# Patient Record
Sex: MALE | Race: BLACK OR AFRICAN AMERICAN | NOT HISPANIC OR LATINO | Employment: OTHER | URBAN - METROPOLITAN AREA
[De-identification: names, ages, dates, MRNs, and addresses within clinical notes are randomized per-mention and may not be internally consistent; named-entity substitution may affect disease eponyms.]

---

## 2017-11-14 ENCOUNTER — HOSPITAL ENCOUNTER (INPATIENT)
Facility: HOSPITAL | Age: 67
LOS: 2 days | Discharge: HOME | DRG: 176 | End: 2017-11-16
Attending: INTERNAL MEDICINE | Admitting: INTERNAL MEDICINE
Payer: MEDICARE

## 2017-11-14 DIAGNOSIS — I26.92 ACUTE SADDLE PULMONARY EMBOLISM: ICD-10-CM

## 2017-11-14 DIAGNOSIS — I26.99 PULMONARY EMBOLISM: ICD-10-CM

## 2017-11-14 PROBLEM — E11.9 TYPE 2 DIABETES MELLITUS WITHOUT COMPLICATION: Chronic | Status: ACTIVE | Noted: 2017-11-14

## 2017-11-14 PROBLEM — R63.4 WEIGHT LOSS: Status: ACTIVE | Noted: 2017-11-14

## 2017-11-14 LAB
ALBUMIN SERPL BCP-MCNC: 2.9 G/DL
ALP SERPL-CCNC: 60 U/L
ALT SERPL W/O P-5'-P-CCNC: 15 U/L
ANION GAP SERPL CALC-SCNC: 14 MMOL/L
AST SERPL-CCNC: 22 U/L
BASOPHILS # BLD AUTO: 0.02 K/UL
BASOPHILS NFR BLD: 0.2 %
BILIRUB SERPL-MCNC: 0.9 MG/DL
BNP SERPL-MCNC: 51 PG/ML
BUN SERPL-MCNC: 10 MG/DL
CALCIUM SERPL-MCNC: 9.3 MG/DL
CHLORIDE SERPL-SCNC: 98 MMOL/L
CO2 SERPL-SCNC: 27 MMOL/L
CREAT SERPL-MCNC: 0.8 MG/DL
DIFFERENTIAL METHOD: ABNORMAL
EOSINOPHIL # BLD AUTO: 0.1 K/UL
EOSINOPHIL NFR BLD: 1.3 %
ERYTHROCYTE [DISTWIDTH] IN BLOOD BY AUTOMATED COUNT: 14.6 %
EST. GFR  (AFRICAN AMERICAN): >60 ML/MIN/1.73 M^2
EST. GFR  (NON AFRICAN AMERICAN): >60 ML/MIN/1.73 M^2
FACT X PPP CHRO-ACNC: >1.78 IU/ML
GLUCOSE SERPL-MCNC: 191 MG/DL
HCT VFR BLD AUTO: 33.7 %
HGB BLD-MCNC: 11.6 G/DL
IMM GRANULOCYTES # BLD AUTO: 0.05 K/UL
IMM GRANULOCYTES NFR BLD AUTO: 0.6 %
INR PPP: 1.2
LYMPHOCYTES # BLD AUTO: 3.5 K/UL
LYMPHOCYTES NFR BLD: 40.9 %
MAGNESIUM SERPL-MCNC: 1.6 MG/DL
MCH RBC QN AUTO: 29.7 PG
MCHC RBC AUTO-ENTMCNC: 34.4 G/DL
MCV RBC AUTO: 86 FL
MONOCYTES # BLD AUTO: 0.9 K/UL
MONOCYTES NFR BLD: 10.4 %
NEUTROPHILS # BLD AUTO: 3.9 K/UL
NEUTROPHILS NFR BLD: 46.6 %
NRBC BLD-RTO: 0 /100 WBC
PLATELET # BLD AUTO: 187 K/UL
PMV BLD AUTO: 11.4 FL
POTASSIUM SERPL-SCNC: 2.8 MMOL/L
PROT SERPL-MCNC: 6.3 G/DL
PROTHROMBIN TIME: 12.3 SEC
RBC # BLD AUTO: 3.9 M/UL
SODIUM SERPL-SCNC: 139 MMOL/L
TROPONIN I SERPL DL<=0.01 NG/ML-MCNC: 0.06 NG/ML
WBC # BLD AUTO: 8.44 K/UL

## 2017-11-14 PROCEDURE — 80053 COMPREHEN METABOLIC PANEL: CPT

## 2017-11-14 PROCEDURE — 93010 ELECTROCARDIOGRAM REPORT: CPT | Mod: ,,, | Performed by: INTERNAL MEDICINE

## 2017-11-14 PROCEDURE — 83880 ASSAY OF NATRIURETIC PEPTIDE: CPT

## 2017-11-14 PROCEDURE — 20000000 HC ICU ROOM

## 2017-11-14 PROCEDURE — 83735 ASSAY OF MAGNESIUM: CPT

## 2017-11-14 PROCEDURE — 94761 N-INVAS EAR/PLS OXIMETRY MLT: CPT

## 2017-11-14 PROCEDURE — 85610 PROTHROMBIN TIME: CPT

## 2017-11-14 PROCEDURE — 85520 HEPARIN ASSAY: CPT

## 2017-11-14 PROCEDURE — 85025 COMPLETE CBC W/AUTO DIFF WBC: CPT

## 2017-11-14 PROCEDURE — 99223 1ST HOSP IP/OBS HIGH 75: CPT | Mod: AI,GC,, | Performed by: INTERNAL MEDICINE

## 2017-11-14 PROCEDURE — 25000003 PHARM REV CODE 250: Performed by: INTERNAL MEDICINE

## 2017-11-14 PROCEDURE — 84484 ASSAY OF TROPONIN QUANT: CPT

## 2017-11-14 PROCEDURE — 63600175 PHARM REV CODE 636 W HCPCS: Performed by: INTERNAL MEDICINE

## 2017-11-14 RX ORDER — INSULIN ASPART 100 [IU]/ML
0-5 INJECTION, SOLUTION INTRAVENOUS; SUBCUTANEOUS EVERY 6 HOURS PRN
Status: DISCONTINUED | OUTPATIENT
Start: 2017-11-14 | End: 2017-11-16 | Stop reason: HOSPADM

## 2017-11-14 RX ORDER — PANTOPRAZOLE SODIUM 40 MG/1
40 TABLET, DELAYED RELEASE ORAL DAILY
Status: DISCONTINUED | OUTPATIENT
Start: 2017-11-15 | End: 2017-11-16 | Stop reason: HOSPADM

## 2017-11-14 RX ORDER — GLUCAGON 1 MG
1 KIT INJECTION
Status: DISCONTINUED | OUTPATIENT
Start: 2017-11-14 | End: 2017-11-16 | Stop reason: HOSPADM

## 2017-11-14 RX ORDER — POTASSIUM CHLORIDE 20 MEQ/1
60 TABLET, EXTENDED RELEASE ORAL
Status: COMPLETED | OUTPATIENT
Start: 2017-11-14 | End: 2017-11-15

## 2017-11-14 RX ORDER — HEPARIN SODIUM,PORCINE/D5W 25000/250
19 INTRAVENOUS SOLUTION INTRAVENOUS CONTINUOUS
Status: DISCONTINUED | OUTPATIENT
Start: 2017-11-14 | End: 2017-11-16 | Stop reason: HOSPADM

## 2017-11-14 RX ADMIN — HEPARIN SODIUM AND DEXTROSE 19 UNITS/KG/HR: 10000; 5 INJECTION INTRAVENOUS at 07:11

## 2017-11-14 RX ADMIN — POTASSIUM CHLORIDE 60 MEQ: 1500 TABLET, EXTENDED RELEASE ORAL at 10:11

## 2017-11-14 RX ADMIN — FOLIC ACID: 5 INJECTION, SOLUTION INTRAMUSCULAR; INTRAVENOUS; SUBCUTANEOUS at 10:11

## 2017-11-15 LAB
ALBUMIN SERPL BCP-MCNC: 2.6 G/DL
ALP SERPL-CCNC: 60 U/L
ALT SERPL W/O P-5'-P-CCNC: 12 U/L
ANION GAP SERPL CALC-SCNC: 10 MMOL/L
AST SERPL-CCNC: 24 U/L
BASOPHILS # BLD AUTO: 0.03 K/UL
BASOPHILS NFR BLD: 0.4 %
BILIRUB SERPL-MCNC: 0.9 MG/DL
BUN SERPL-MCNC: 9 MG/DL
CALCIUM SERPL-MCNC: 8.9 MG/DL
CHLORIDE SERPL-SCNC: 103 MMOL/L
CO2 SERPL-SCNC: 27 MMOL/L
CREAT SERPL-MCNC: 0.8 MG/DL
DIASTOLIC DYSFUNCTION: NO
DIFFERENTIAL METHOD: ABNORMAL
EOSINOPHIL # BLD AUTO: 0.2 K/UL
EOSINOPHIL NFR BLD: 2.3 %
ERYTHROCYTE [DISTWIDTH] IN BLOOD BY AUTOMATED COUNT: 14.4 %
EST. GFR  (AFRICAN AMERICAN): >60 ML/MIN/1.73 M^2
EST. GFR  (NON AFRICAN AMERICAN): >60 ML/MIN/1.73 M^2
ESTIMATED PA SYSTOLIC PRESSURE: 26.42
FACT X PPP CHRO-ACNC: 0.7 IU/ML
FACT X PPP CHRO-ACNC: 1.26 IU/ML
GLUCOSE SERPL-MCNC: 149 MG/DL
HCT VFR BLD AUTO: 31.2 %
HGB BLD-MCNC: 10.7 G/DL
IMM GRANULOCYTES # BLD AUTO: 0.03 K/UL
IMM GRANULOCYTES NFR BLD AUTO: 0.4 %
LYMPHOCYTES # BLD AUTO: 3.5 K/UL
LYMPHOCYTES NFR BLD: 44.3 %
MAGNESIUM SERPL-MCNC: 1.7 MG/DL
MAGNESIUM SERPL-MCNC: 1.9 MG/DL
MCH RBC QN AUTO: 29.7 PG
MCHC RBC AUTO-ENTMCNC: 34.3 G/DL
MCV RBC AUTO: 87 FL
MITRAL VALVE MOBILITY: NORMAL
MONOCYTES # BLD AUTO: 0.8 K/UL
MONOCYTES NFR BLD: 10.2 %
NEUTROPHILS # BLD AUTO: 3.3 K/UL
NEUTROPHILS NFR BLD: 42.4 %
NRBC BLD-RTO: 0 /100 WBC
PLATELET # BLD AUTO: 171 K/UL
PMV BLD AUTO: 11.4 FL
POCT GLUCOSE: 151 MG/DL (ref 70–110)
POCT GLUCOSE: 168 MG/DL (ref 70–110)
POCT GLUCOSE: 172 MG/DL (ref 70–110)
POCT GLUCOSE: 173 MG/DL (ref 70–110)
POTASSIUM SERPL-SCNC: 2.6 MMOL/L
POTASSIUM SERPL-SCNC: 3.2 MMOL/L
POTASSIUM SERPL-SCNC: 3.3 MMOL/L
PROT SERPL-MCNC: 5.4 G/DL
RBC # BLD AUTO: 3.6 M/UL
RETIRED EF AND QEF - SEE NOTES: 60 (ref 55–65)
SODIUM SERPL-SCNC: 140 MMOL/L
TROPONIN I SERPL DL<=0.01 NG/ML-MCNC: 0.06 NG/ML
TROPONIN I SERPL DL<=0.01 NG/ML-MCNC: 0.06 NG/ML
WBC # BLD AUTO: 7.78 K/UL

## 2017-11-15 PROCEDURE — 94761 N-INVAS EAR/PLS OXIMETRY MLT: CPT

## 2017-11-15 PROCEDURE — 93970 EXTREMITY STUDY: CPT

## 2017-11-15 PROCEDURE — 80053 COMPREHEN METABOLIC PANEL: CPT

## 2017-11-15 PROCEDURE — 85520 HEPARIN ASSAY: CPT

## 2017-11-15 PROCEDURE — 63600175 PHARM REV CODE 636 W HCPCS: Performed by: INTERNAL MEDICINE

## 2017-11-15 PROCEDURE — 99232 SBSQ HOSP IP/OBS MODERATE 35: CPT | Mod: GC,,, | Performed by: INTERNAL MEDICINE

## 2017-11-15 PROCEDURE — 25000003 PHARM REV CODE 250: Performed by: STUDENT IN AN ORGANIZED HEALTH CARE EDUCATION/TRAINING PROGRAM

## 2017-11-15 PROCEDURE — 25000003 PHARM REV CODE 250: Performed by: INTERNAL MEDICINE

## 2017-11-15 PROCEDURE — 93970 EXTREMITY STUDY: CPT | Mod: 26,,, | Performed by: INTERNAL MEDICINE

## 2017-11-15 PROCEDURE — 93306 TTE W/DOPPLER COMPLETE: CPT | Mod: 26,,, | Performed by: INTERNAL MEDICINE

## 2017-11-15 PROCEDURE — 85025 COMPLETE CBC W/AUTO DIFF WBC: CPT

## 2017-11-15 PROCEDURE — 83735 ASSAY OF MAGNESIUM: CPT

## 2017-11-15 PROCEDURE — 84132 ASSAY OF SERUM POTASSIUM: CPT | Mod: 91

## 2017-11-15 PROCEDURE — 84484 ASSAY OF TROPONIN QUANT: CPT | Mod: 91

## 2017-11-15 PROCEDURE — 83735 ASSAY OF MAGNESIUM: CPT | Mod: 91

## 2017-11-15 PROCEDURE — 20600001 HC STEP DOWN PRIVATE ROOM

## 2017-11-15 PROCEDURE — 84132 ASSAY OF SERUM POTASSIUM: CPT

## 2017-11-15 PROCEDURE — 84484 ASSAY OF TROPONIN QUANT: CPT

## 2017-11-15 PROCEDURE — 93306 TTE W/DOPPLER COMPLETE: CPT

## 2017-11-15 RX ORDER — POTASSIUM CHLORIDE 20 MEQ/1
60 TABLET, EXTENDED RELEASE ORAL ONCE
Status: COMPLETED | OUTPATIENT
Start: 2017-11-15 | End: 2017-11-15

## 2017-11-15 RX ORDER — MAGNESIUM SULFATE HEPTAHYDRATE 40 MG/ML
2 INJECTION, SOLUTION INTRAVENOUS ONCE
Status: COMPLETED | OUTPATIENT
Start: 2017-11-15 | End: 2017-11-15

## 2017-11-15 RX ORDER — POTASSIUM CHLORIDE 7.45 MG/ML
10 INJECTION INTRAVENOUS ONCE
Status: COMPLETED | OUTPATIENT
Start: 2017-11-15 | End: 2017-11-15

## 2017-11-15 RX ORDER — POTASSIUM CHLORIDE 20 MEQ/1
60 TABLET, EXTENDED RELEASE ORAL
Status: DISCONTINUED | OUTPATIENT
Start: 2017-11-15 | End: 2017-11-15

## 2017-11-15 RX ORDER — MAGNESIUM SULFATE HEPTAHYDRATE 40 MG/ML
2 INJECTION, SOLUTION INTRAVENOUS ONCE
Status: DISCONTINUED | OUTPATIENT
Start: 2017-11-15 | End: 2017-11-15

## 2017-11-15 RX ADMIN — HEPARIN SODIUM AND DEXTROSE 13 UNITS/KG/HR: 10000; 5 INJECTION INTRAVENOUS at 06:11

## 2017-11-15 RX ADMIN — PANTOPRAZOLE SODIUM 40 MG: 40 TABLET, DELAYED RELEASE ORAL at 08:11

## 2017-11-15 RX ADMIN — POTASSIUM CHLORIDE 60 MEQ: 1500 TABLET, EXTENDED RELEASE ORAL at 01:11

## 2017-11-15 RX ADMIN — POTASSIUM CHLORIDE 10 MEQ: 10 INJECTION, SOLUTION INTRAVENOUS at 04:11

## 2017-11-15 RX ADMIN — MAGNESIUM SULFATE IN WATER 2 G: 40 INJECTION, SOLUTION INTRAVENOUS at 01:11

## 2017-11-15 RX ADMIN — POTASSIUM CHLORIDE 60 MEQ: 1500 TABLET, EXTENDED RELEASE ORAL at 08:11

## 2017-11-15 RX ADMIN — POTASSIUM CHLORIDE 10 MEQ: 10 INJECTION, SOLUTION INTRAVENOUS at 03:11

## 2017-11-15 RX ADMIN — MAGNESIUM SULFATE IN WATER 2 G: 40 INJECTION, SOLUTION INTRAVENOUS at 06:11

## 2017-11-15 RX ADMIN — POTASSIUM CHLORIDE 60 MEQ: 1500 TABLET, EXTENDED RELEASE ORAL at 03:11

## 2017-11-15 NOTE — PROGRESS NOTES
Notified MD Mandujano of pts K 3.3 & mag 1.9. New order to replace potassium. Per MD Mandujano, magnesium ok at 1.9 without furthur replacement. Will continue to monitor.

## 2017-11-15 NOTE — SUBJECTIVE & OBJECTIVE
Interval History: NAEON. Patient denies CP, SOB or discomfort    Review of Systems   Constitution: Positive for weakness. Negative for malaise/fatigue, night sweats and weight loss.   HENT: Negative for nosebleeds.    Eyes: Negative for vision loss in left eye and vision loss in right eye.   Cardiovascular: Positive for dyspnea on exertion. Negative for chest pain, claudication, cyanosis, irregular heartbeat, leg swelling and near-syncope.   Respiratory: Positive for shortness of breath (on exertion ). Negative for cough and sleep disturbances due to breathing.    Endocrine: Negative for cold intolerance and heat intolerance.   Skin: Negative for rash and skin cancer.   Musculoskeletal: Negative for back pain and falls.   Gastrointestinal: Negative for melena, nausea and vomiting.   Genitourinary: Negative for frequency and pelvic pain.     Objective:     Vital Signs (Most Recent):  Temp: 97.5 °F (36.4 °C) (11/15/17 1100)  Pulse: 80 (11/15/17 1300)  Resp: 15 (11/15/17 1300)  BP: (!) 150/87 (11/15/17 1300)  SpO2: 96 % (11/15/17 1300) Vital Signs (24h Range):  Temp:  [97.5 °F (36.4 °C)-98.6 °F (37 °C)] 97.5 °F (36.4 °C)  Pulse:  [67-80] 80  Resp:  [11-29] 15  SpO2:  [96 %-100 %] 96 %  BP: (114-154)/(58-87) 150/87     Weight: 110 kg (242 lb 8.1 oz)  Body mass index is 31.99 kg/m².     SpO2: 96 %  O2 Device (Oxygen Therapy): room air      Intake/Output Summary (Last 24 hours) at 11/15/17 1336  Last data filed at 11/15/17 1300   Gross per 24 hour   Intake          1110.35 ml   Output              225 ml   Net           885.35 ml       Lines/Drains/Airways     Peripheral Intravenous Line                 Peripheral IV - Single Lumen 11/14/17 Left Hand 1 day         Peripheral IV - Single Lumen 11/14/17 Right Antecubital 1 day                Physical Exam   Constitutional: He is oriented to person, place, and time. He appears well-developed and well-nourished.   HENT:   Head: Normocephalic and atraumatic.   Eyes: EOM are  normal. Pupils are equal, round, and reactive to light.   Neck: Normal range of motion. Neck supple. No JVD present.   Cardiovascular: Normal rate, regular rhythm, normal heart sounds and intact distal pulses.  Exam reveals no gallop and no friction rub.    No murmur heard.  Pulmonary/Chest: Effort normal and breath sounds normal. No respiratory distress. He has no wheezes. He has no rales. He exhibits no tenderness.   Abdominal: Soft. Bowel sounds are normal. He exhibits no distension. There is no tenderness.   Excess skin on abdomen   Musculoskeletal: Normal range of motion. He exhibits no edema or tenderness.   Neurological: He is alert and oriented to person, place, and time.   Skin: Skin is warm and dry.   Vitals reviewed.      Significant Labs:   Blood Culture: No results for input(s): LABBLOO in the last 48 hours., CMP   Recent Labs  Lab 11/14/17  1945 11/15/17  0045 11/15/17  0415     --  140   K 2.8* 2.6* 3.2*   CL 98  --  103   CO2 27  --  27   *  --  149*   BUN 10  --  9   CREATININE 0.8  --  0.8   CALCIUM 9.3  --  8.9   PROT 6.3  --  5.4*   ALBUMIN 2.9*  --  2.6*   BILITOT 0.9  --  0.9   ALKPHOS 60  --  60   AST 22  --  24   ALT 15  --  12   ANIONGAP 14  --  10   ESTGFRAFRICA >60.0  --  >60.0   EGFRNONAA >60.0  --  >60.0   , CBC   Recent Labs  Lab 11/14/17  1945 11/15/17  0415   WBC 8.44 7.78   HGB 11.6* 10.7*   HCT 33.7* 31.2*    171    and Troponin   Recent Labs  Lab 11/14/17  1945 11/15/17  0045 11/15/17  0415   TROPONINI 0.062* 0.061* 0.056*       Significant Imaging: Echocardiogram:   2D echo with color flow doppler:   Results for orders placed or performed during the hospital encounter of 11/14/17   2D echo with color flow doppler   Result Value Ref Range    EF 60 55 - 65    Diastolic Dysfunction No     Est. PA Systolic Pressure 26.42     Mitral Valve Mobility NORMAL

## 2017-11-15 NOTE — ASSESSMENT & PLAN NOTE
Low risk submassive PE  Continue anticoagulation with heparin  Discussed case with interventional cardiology  2d echo in am  Monitor in ICU overnight  Check venous doppler LE in am

## 2017-11-15 NOTE — SUBJECTIVE & OBJECTIVE
Past Medical History:   Diagnosis Date    Diabetes mellitus        Past Surgical History:   Procedure Laterality Date    none         Review of patient's allergies indicates:  No Known Allergies    No current facility-administered medications on file prior to encounter.      No current outpatient prescriptions on file prior to encounter.     Family History     Problem Relation (Age of Onset)    Cancer Father        Social History Main Topics    Smoking status: Former Smoker     Types: Cigarettes    Smokeless tobacco: Not on file    Alcohol use No    Drug use: No    Sexual activity: Not on file     Review of Systems   All other systems reviewed and are negative.    Objective:     Vital Signs (Most Recent):  Pulse: 69 (11/14/17 2301)  Resp: 15 (11/14/17 2301)  BP: (!) 125/58 (11/14/17 2301)  SpO2: 100 % (11/14/17 2301) Vital Signs (24h Range):  Pulse:  [68-75] 69  Resp:  [11-29] 15  SpO2:  [100 %] 100 %  BP: (114-131)/(58-78) 125/58     Weight: 110 kg (242 lb 8.1 oz)  Body mass index is 31.99 kg/m².    SpO2: 100 %  O2 Device (Oxygen Therapy): nasal cannula      Intake/Output Summary (Last 24 hours) at 11/14/17 2323  Last data filed at 11/14/17 2300   Gross per 24 hour   Intake           163.05 ml   Output                0 ml   Net           163.05 ml       Lines/Drains/Airways     Peripheral Intravenous Line                 Peripheral IV - Single Lumen 11/14/17 Left Hand less than 1 day         Peripheral IV - Single Lumen 11/14/17 Right Antecubital less than 1 day                Physical Exam   General: alert, awake and oriented x 3  Eyes:PERRL.   Neck:no JVD   Lungs:  clear to auscultation bilaterally and normal respiratory effort  Cardiovascular: Heart: regular rate and rhythm, S1, S2 normal, no murmur, click, rub or gallop.   Chest Wall: no tenderness.   Extremities: no cyanosis or edema.   Pulses: 2+ and symmetric.  Abdomen/Rectal: Abdomen: soft, non-tender non-distented; bowel sounds normal  Skin: No  rashes or lesions  Neurologic: Normal strength and tone. No focal numbness or weakness  Psych/Behavioral:  Normal.      Significant Labs:   CMP   Recent Labs  Lab 11/14/17 1945      K 2.8*   CL 98   CO2 27   *   BUN 10   CREATININE 0.8   CALCIUM 9.3   PROT 6.3   ALBUMIN 2.9*   BILITOT 0.9   ALKPHOS 60   AST 22   ALT 15   ANIONGAP 14   ESTGFRAFRICA >60.0   EGFRNONAA >60.0   , CBC   Recent Labs  Lab 11/14/17 1945   WBC 8.44   HGB 11.6*   HCT 33.7*       and Troponin   Recent Labs  Lab 11/14/17 1945   TROPONINI 0.062*       Significant Imaging: Echocardiogram: 2D echo with color flow doppler: No results found for this or any previous visit.     EKG-sinus rhythm with non specific changes, No RV strain

## 2017-11-15 NOTE — HOSPITAL COURSE
11/15/17- Continue on heparin for PE and LLE DVT.    11/16/17-patient will be discharged on Xarelto with follow up for PE and weight loss with PCP

## 2017-11-15 NOTE — PLAN OF CARE
11/15/17 1356   Discharge Assessment   Assessment Type Discharge Planning Assessment   Confirmed/corrected address and phone number on facesheet? Yes   Assessment information obtained from? Medical Record   Expected Length of Stay (days) 3   Prior to hospitilization cognitive status: Alert/Oriented   Prior to hospitalization functional status: Independent   Current cognitive status: Alert/Oriented   Current Functional Status: Needs Assistance   Facility Arrived From: home via ED   Lives With alone   Able to Return to Prior Arrangements yes   Is patient able to care for self after discharge? Yes   Who are your caregiver(s) and their phone number(s)? Ladarius (Sister) [phone]   Patient's perception of discharge disposition home or selfcare   Readmission Within The Last 30 Days no previous admission in last 30 days   Patient currently being followed by outpatient case management? No   Patient currently receives any other outside agency services? No   Equipment Currently Used at Home none   Do you have any problems affording any of your prescribed medications? No   Is the patient taking medications as prescribed? yes   Does the patient have transportation home? Yes   Transportation Available car;family or friend will provide   Does the patient receive services at the [facility]? No   Discharge Plan A Home with family   Discharge Plan B Home with family;Home Health   Patient/Family In Agreement With Plan yes     Patient lives in [facility], mows lawns for a living. He is independent with ADL's however, has recently lost 200 lbs within last 6 months. Cards consulted Hem/Onc for work up of cause of DVT/PE. No other needs assessed at this time.

## 2017-11-15 NOTE — PLAN OF CARE
Problem: Patient Care Overview  Goal: Plan of Care Review  Outcome: Ongoing (interventions implemented as appropriate)  No acute events throughout the night. Pt remains on Heparin gtt per MD orders. Pt given Potassium and magnesium replacements. VS and assessment per flow sheet. Patient progressing towards goals as tolerated. Plan of care reviewed with patient. All questions and concerns addressed, will continue to monitor.

## 2017-11-15 NOTE — SUBJECTIVE & OBJECTIVE
No past medical history on file.    No past surgical history on file.    Review of patient's allergies indicates:  No Known Allergies    No current facility-administered medications on file prior to encounter.      No current outpatient prescriptions on file prior to encounter.     Family History     None        Social History Main Topics    Smoking status: Not on file    Smokeless tobacco: Not on file    Alcohol use Not on file    Drug use: Unknown    Sexual activity: Not on file     Review of Systems   Constitution: Positive for weakness and malaise/fatigue. Negative for night sweats and weight loss.   HENT: Negative for nosebleeds.    Eyes: Negative for vision loss in left eye and vision loss in right eye.   Cardiovascular: Positive for dyspnea on exertion. Negative for chest pain, claudication, cyanosis, irregular heartbeat, leg swelling and near-syncope.   Respiratory: Positive for shortness of breath. Negative for cough and sleep disturbances due to breathing.    Endocrine: Negative for cold intolerance and heat intolerance.     Objective:     Vital Signs (Most Recent):    Vital Signs (24h Range):        Weight: 108 kg (238 lb 1.6 oz)  Body mass index is 31.41 kg/m².            No intake or output data in the 24 hours ending 11/14/17 2059    Lines/Drains/Airways     Peripheral Intravenous Line                 Peripheral IV - Single Lumen 11/14/17 Left Hand less than 1 day         Peripheral IV - Single Lumen 11/14/17 Right Antecubital less than 1 day                Physical Exam   Constitutional: He is oriented to person, place, and time. He appears well-developed and well-nourished.   HENT:   Head: Normocephalic and atraumatic.   Eyes: EOM are normal. Pupils are equal, round, and reactive to light.   Neck: Normal range of motion. Neck supple. No JVD present.   Cardiovascular: Normal rate, regular rhythm, normal heart sounds and intact distal pulses.  Exam reveals no gallop and no friction rub.    No  murmur heard.  Pulmonary/Chest: Effort normal and breath sounds normal. No respiratory distress. He has no wheezes. He has no rales. He exhibits no tenderness.   Abdominal: Soft. Bowel sounds are normal. He exhibits no distension. There is no tenderness.   Musculoskeletal: Normal range of motion. He exhibits no edema or tenderness.   Neurological: He is alert and oriented to person, place, and time.   Skin: Skin is warm and dry.   Vitals reviewed.      Significant Labs:   BMP:   Recent Labs  Lab 11/14/17 1945   *      K 2.8*   CL 98   CO2 27   BUN 10   CREATININE 0.8   CALCIUM 9.3   MG 1.6       Significant Imaging: Echocardiogram: 2D echo with color flow doppler: no rv strain noted.

## 2017-11-15 NOTE — ASSESSMENT & PLAN NOTE
-Unintentional 200 Lb loss per patient in last 4-6 months  -would consider heme-onc consult and rule out malignancy as precipating factor for VTE

## 2017-11-15 NOTE — ASSESSMENT & PLAN NOTE
Patient with low risk submassive PE, currently no indication for thrombolysis.  HD stable, no RV strain, mildly elevated troponin.  Recommend anticoagulation with heparin for now.  Needs full eval for oncologic etiology of PE as has recent 200 lb weight loss.  Recommend Heme/onc consult.

## 2017-11-15 NOTE — HPI
67 year old male with h/o DM-2, HTN who presents as a transfer from [facility] with diagnosis of PE. Patient is currently resting in bed and denying any complaints.  HR is 70, BP is 117/78 satting 100% on 2 l NC.  He reports that he has been having dizziness and SOB on exertion x 4 months. Patient denies any chest pain, syncope, cough, hemoptysis.  Of note he has an almost 200 lb weight loss over past 6 months. 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    Echo: no RV strain, tapse 1.9

## 2017-11-15 NOTE — CONSULTS
[facility]  Cardiology  Consult Note    Patient Name: Juan Johnson  MRN: [id number]  Admission Date: 11/14/2017  Hospital Length of Stay: 0 days  Code Status: Full Code   Attending Provider: Gera Delacruz MD   Consulting Provider: Deanne Lamb MD  Primary Care Physician: Primary Doctor No  Principal Problem:<principal problem not specified>    Patient information was obtained from patient and ER records.     Inpatient consult to Interventional Cardiology  Consult performed by: DEANNE LAMB  Consult ordered by: AFSHIN BLANTON  Reason for consult: PE        Subjective:     Chief Complaint:  PE    HPI:   67 year old male with h/o DM-2, HTN who presents as a transfer from [facility] with diagnosis of PE. Patient is currently resting in bed and denying any complaints.  HR is 70, BP is 117/78 satting 100% on 2 l NC.  He reports that he has been having dizziness and SOB on exertion x 4 months. Patient denies any chest pain, syncope, cough, hemoptysis.  Of note he has an almost 200 lb weight loss over past 6 months.     Echo: no RV strain, tapse 1.9    No past medical history on file.    No past surgical history on file.    Review of patient's allergies indicates:  No Known Allergies    No current facility-administered medications on file prior to encounter.      No current outpatient prescriptions on file prior to encounter.     Family History     None        Social History Main Topics    Smoking status: Not on file    Smokeless tobacco: Not on file    Alcohol use Not on file    Drug use: Unknown    Sexual activity: Not on file     Review of Systems   Constitution: Positive for weakness and malaise/fatigue. Negative for night sweats and weight loss.   HENT: Negative for nosebleeds.    Eyes: Negative for vision loss in left eye and vision loss in right eye.   Cardiovascular: Positive for dyspnea on exertion. Negative for chest pain, claudication, cyanosis, irregular  heartbeat, leg swelling and near-syncope.   Respiratory: Positive for shortness of breath. Negative for cough and sleep disturbances due to breathing.    Endocrine: Negative for cold intolerance and heat intolerance.     Objective:     Vital Signs (Most Recent):    Vital Signs (24h Range):        Weight: 108 kg (238 lb 1.6 oz)  Body mass index is 31.41 kg/m².            No intake or output data in the 24 hours ending 11/14/17 2059    Lines/Drains/Airways     Peripheral Intravenous Line                 Peripheral IV - Single Lumen 11/14/17 Left Hand less than 1 day         Peripheral IV - Single Lumen 11/14/17 Right Antecubital less than 1 day                Physical Exam   Constitutional: He is oriented to person, place, and time. He appears well-developed and well-nourished.   HENT:   Head: Normocephalic and atraumatic.   Eyes: EOM are normal. Pupils are equal, round, and reactive to light.   Neck: Normal range of motion. Neck supple. No JVD present.   Cardiovascular: Normal rate, regular rhythm, normal heart sounds and intact distal pulses.  Exam reveals no gallop and no friction rub.    No murmur heard.  Pulmonary/Chest: Effort normal and breath sounds normal. No respiratory distress. He has no wheezes. He has no rales. He exhibits no tenderness.   Abdominal: Soft. Bowel sounds are normal. He exhibits no distension. There is no tenderness.   Musculoskeletal: Normal range of motion. He exhibits no edema or tenderness.   Neurological: He is alert and oriented to person, place, and time.   Skin: Skin is warm and dry.   Vitals reviewed.      Significant Labs:   BMP:   Recent Labs  Lab 11/14/17 1945   *      K 2.8*   CL 98   CO2 27   BUN 10   CREATININE 0.8   CALCIUM 9.3   MG 1.6       Significant Imaging: Echocardiogram: 2D echo with color flow doppler: no rv strain noted.    Assessment and Plan:     Acute saddle pulmonary embolism    Patient with low risk submassive PE, currently no indication for  thrombolysis.  HD stable, no RV strain, mildly elevated troponin.  Recommend anticoagulation with heparin for now.  Needs full eval for oncologic etiology of PE as has recent 200 lb weight loss.  Recommend Heme/onc consult.            VTE Risk Mitigation         Ordered     heparin 25,000 units in dextrose 5% 250 mL (100 units/mL) infusion  Continuous     Route:  Intravenous        11/14/17 1927     heparin 25,000 units in dextrose 5% 250 mL (100 units/mL) bolus from bag; ADDITIONAL PRN BOLUS  As needed (PRN)     Route:  Intravenous        11/14/17 1927     heparin 25,000 units in dextrose 5% 250 mL (100 units/mL) bolus from bag; ADDITIONAL PRN BOLUS  As needed (PRN)     Route:  Intravenous        11/14/17 1927          Thank you for your consult. I will follow-up with patient. Please contact us if you have any additional questions.    Deanne Rodriguez MD  Cardiology   [facility]

## 2017-11-15 NOTE — PROGRESS NOTES
[facility]  Cardiology  Progress Note    Patient Name: Juan Johnson  MRN: [id number]  Admission Date: 11/14/2017  Hospital Length of Stay: 1 days  Code Status: Full Code   Attending Physician: Gera Delacruz MD   Primary Care Physician: Primary Doctor No  Expected Discharge Date: 11/17/2017  Principal Problem:Acute saddle pulmonary embolism    Subjective:     Hospital Course:   11/15/17- Continue on heparin for PE and LLE DVT.      Interval History: NAEON. Patient denies CP, SOB or discomfort    Review of Systems   Constitution: Positive for weakness. Negative for malaise/fatigue, night sweats and weight loss.   HENT: Negative for nosebleeds.    Eyes: Negative for vision loss in left eye and vision loss in right eye.   Cardiovascular: Positive for dyspnea on exertion. Negative for chest pain, claudication, cyanosis, irregular heartbeat, leg swelling and near-syncope.   Respiratory: Positive for shortness of breath (on exertion ). Negative for cough and sleep disturbances due to breathing.    Endocrine: Negative for cold intolerance and heat intolerance.   Skin: Negative for rash and skin cancer.   Musculoskeletal: Negative for back pain and falls.   Gastrointestinal: Negative for melena, nausea and vomiting.   Genitourinary: Negative for frequency and pelvic pain.     Objective:     Vital Signs (Most Recent):  Temp: 97.5 °F (36.4 °C) (11/15/17 1100)  Pulse: 80 (11/15/17 1300)  Resp: 15 (11/15/17 1300)  BP: (!) 150/87 (11/15/17 1300)  SpO2: 96 % (11/15/17 1300) Vital Signs (24h Range):  Temp:  [97.5 °F (36.4 °C)-98.6 °F (37 °C)] 97.5 °F (36.4 °C)  Pulse:  [67-80] 80  Resp:  [11-29] 15  SpO2:  [96 %-100 %] 96 %  BP: (114-154)/(58-87) 150/87     Weight: 110 kg (242 lb 8.1 oz)  Body mass index is 31.99 kg/m².     SpO2: 96 %  O2 Device (Oxygen Therapy): room air      Intake/Output Summary (Last 24 hours) at 11/15/17 1336  Last data filed at 11/15/17 1300   Gross per 24 hour   Intake           1110.35 ml   Output              225 ml   Net           885.35 ml       Lines/Drains/Airways     Peripheral Intravenous Line                 Peripheral IV - Single Lumen 11/14/17 Left Hand 1 day         Peripheral IV - Single Lumen 11/14/17 Right Antecubital 1 day                Physical Exam   Constitutional: He is oriented to person, place, and time. He appears well-developed and well-nourished.   HENT:   Head: Normocephalic and atraumatic.   Eyes: EOM are normal. Pupils are equal, round, and reactive to light.   Neck: Normal range of motion. Neck supple. No JVD present.   Cardiovascular: Normal rate, regular rhythm, normal heart sounds and intact distal pulses.  Exam reveals no gallop and no friction rub.    No murmur heard.  Pulmonary/Chest: Effort normal and breath sounds normal. No respiratory distress. He has no wheezes. He has no rales. He exhibits no tenderness.   Abdominal: Soft. Bowel sounds are normal. He exhibits no distension. There is no tenderness.   Excess skin on abdomen   Musculoskeletal: Normal range of motion. He exhibits no edema or tenderness.   Neurological: He is alert and oriented to person, place, and time.   Skin: Skin is warm and dry.   Vitals reviewed.      Significant Labs:   Blood Culture: No results for input(s): LABBLOO in the last 48 hours., CMP   Recent Labs  Lab 11/14/17  1945 11/15/17  0045 11/15/17  0415     --  140   K 2.8* 2.6* 3.2*   CL 98  --  103   CO2 27  --  27   *  --  149*   BUN 10  --  9   CREATININE 0.8  --  0.8   CALCIUM 9.3  --  8.9   PROT 6.3  --  5.4*   ALBUMIN 2.9*  --  2.6*   BILITOT 0.9  --  0.9   ALKPHOS 60  --  60   AST 22  --  24   ALT 15  --  12   ANIONGAP 14  --  10   ESTGFRAFRICA >60.0  --  >60.0   EGFRNONAA >60.0  --  >60.0   , CBC   Recent Labs  Lab 11/14/17  1945 11/15/17  0415   WBC 8.44 7.78   HGB 11.6* 10.7*   HCT 33.7* 31.2*    171    and Troponin   Recent Labs  Lab 11/14/17  1945 11/15/17  0045 11/15/17  0415   TROPONINI  0.062* 0.061* 0.056*       Significant Imaging: Echocardiogram:   2D echo with color flow doppler:   Results for orders placed or performed during the hospital encounter of 11/14/17   2D echo with color flow doppler   Result Value Ref Range    EF 60 55 - 65    Diastolic Dysfunction No     Est. PA Systolic Pressure 26.42     Mitral Valve Mobility NORMAL      Assessment and Plan:       * Acute saddle pulmonary embolism    -Low risk submassive PE and venous doppler LE Indeterminate DVT of the Popliteal Vein.  -Continue anticoagulation with heparin  -Discussed case with interventional cardiology - no need for intervention at this time  - 2d echo showed normal left ventricular systolic function          Weight loss    -Unintentional 200 Lb loss per patient in last 4-6 months  -on discharge can follow up with PCP        Type 2 diabetes mellitus without complication    -Was on oral meds but were discontinued and now diet controlled  -Insulin sliding scale            VTE Risk Mitigation         Ordered     High Risk of VTE  Once      11/15/17 0844     Reason for no Mechanical VTE Prophylaxis  Once      11/15/17 0844     heparin 25,000 units in dextrose 5% 250 mL (100 units/mL) infusion  Continuous     Route:  Intravenous        11/14/17 1927     heparin 25,000 units in dextrose 5% 250 mL (100 units/mL) bolus from bag; ADDITIONAL PRN BOLUS  As needed (PRN)     Route:  Intravenous        11/14/17 1927     heparin 25,000 units in dextrose 5% 250 mL (100 units/mL) bolus from bag; ADDITIONAL PRN BOLUS  As needed (PRN)     Route:  Intravenous        11/14/17 1927          Kristine Mandujano MD  Cardiology  [facility]

## 2017-11-15 NOTE — ASSESSMENT & PLAN NOTE
-Low risk submassive PE and venous doppler LE Indeterminate DVT of the Popliteal Vein.  -Continue anticoagulation with heparin  -Discussed case with interventional cardiology - no need for intervention at this time  - 2d echo showed normal left ventricular systolic function

## 2017-11-15 NOTE — H&P
[facility]  Cardiology  History and Physical     Patient Name: Juan Johnson  MRN: [id number]  Admission Date: 11/14/2017  Code Status: Full Code   Attending Provider: Gera Delacruz MD   Primary Care Physician: Primary Doctor No  Principal Problem:Acute saddle pulmonary embolism    Patient information was obtained from patient and transfer records.     Subjective:     Chief Complaint:  SOB     HPI:  67 year old male with h/o DM-2, HTN who presents as a transfer from [facility] with diagnosis of PE. Patient is currently resting in bed and denying any complaints.  HR is 70, BP is 117/78 satting 100% on 2 l NC.  He reports that he has been having dizziness and SOB on exertion x 4 months. Patient denies any chest pain, syncope, cough, hemoptysis.  Of note he has an almost 200 lb weight loss over past 6 months. 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    Echo: no RV strain, tapse 1.9    Past Medical History:   Diagnosis Date    Diabetes mellitus        Past Surgical History:   Procedure Laterality Date    none         Review of patient's allergies indicates:  No Known Allergies    No current facility-administered medications on file prior to encounter.      No current outpatient prescriptions on file prior to encounter.     Family History     Problem Relation (Age of Onset)    Cancer Father        Social History Main Topics    Smoking status: Former Smoker     Types: Cigarettes    Smokeless tobacco: Not on file    Alcohol use No    Drug use: No    Sexual activity: Not on file     Review of Systems   All other systems reviewed and are negative.    Objective:     Vital Signs (Most Recent):  Pulse: 69 (11/14/17 2301)  Resp: 15 (11/14/17 2301)  BP: (!) 125/58 (11/14/17 2301)  SpO2: 100 % (11/14/17 2301) Vital Signs (24h Range):  Pulse:  [68-75] 69  Resp:  [11-29] 15  SpO2:  [100 %] 100  %  BP: (114-131)/(58-78) 125/58     Weight: 110 kg (242 lb 8.1 oz)  Body mass index is 31.99 kg/m².    SpO2: 100 %  O2 Device (Oxygen Therapy): nasal cannula      Intake/Output Summary (Last 24 hours) at 11/14/17 2323  Last data filed at 11/14/17 2300   Gross per 24 hour   Intake           163.05 ml   Output                0 ml   Net           163.05 ml       Lines/Drains/Airways     Peripheral Intravenous Line                 Peripheral IV - Single Lumen 11/14/17 Left Hand less than 1 day         Peripheral IV - Single Lumen 11/14/17 Right Antecubital less than 1 day                Physical Exam   General: alert, awake and oriented x 3  Eyes:PERRL.   Neck:no JVD   Lungs:  clear to auscultation bilaterally and normal respiratory effort  Cardiovascular: Heart: regular rate and rhythm, S1, S2 normal, no murmur, click, rub or gallop.   Chest Wall: no tenderness.   Extremities: no cyanosis or edema.   Pulses: 2+ and symmetric.  Abdomen/Rectal: Abdomen: soft, non-tender non-distented; bowel sounds normal  Skin: No rashes or lesions  Neurologic: Normal strength and tone. No focal numbness or weakness  Psych/Behavioral:  Normal.      Significant Labs:   CMP   Recent Labs  Lab 11/14/17 1945      K 2.8*   CL 98   CO2 27   *   BUN 10   CREATININE 0.8   CALCIUM 9.3   PROT 6.3   ALBUMIN 2.9*   BILITOT 0.9   ALKPHOS 60   AST 22   ALT 15   ANIONGAP 14   ESTGFRAFRICA >60.0   EGFRNONAA >60.0   , CBC   Recent Labs  Lab 11/14/17 1945   WBC 8.44   HGB 11.6*   HCT 33.7*       and Troponin   Recent Labs  Lab 11/14/17 1945   TROPONINI 0.062*       Significant Imaging: Echocardiogram: 2D echo with color flow doppler: No results found for this or any previous visit.     EKG-sinus rhythm with non specific changes, No RV strain    Assessment and Plan:     * Acute saddle pulmonary embolism    Low risk submassive PE  Continue anticoagulation with heparin  Discussed case with interventional cardiology  2d echo in  am  Monitor in ICU overnight  Check venous doppler LE in am        Weight loss    -Unintentional 200 Lb loss per patient in last 4-6 months  -would consider heme-onc consult and rule out malignancy as precipating factor for VTE        Type 2 diabetes mellitus without complication    Was on oral meds but were discontinued and now diet controlled  Insulin sliding scale            VTE Risk Mitigation         Ordered     heparin 25,000 units in dextrose 5% 250 mL (100 units/mL) infusion  Continuous     Route:  Intravenous        11/14/17 1927     heparin 25,000 units in dextrose 5% 250 mL (100 units/mL) bolus from bag; ADDITIONAL PRN BOLUS  As needed (PRN)     Route:  Intravenous        11/14/17 1927     heparin 25,000 units in dextrose 5% 250 mL (100 units/mL) bolus from bag; ADDITIONAL PRN BOLUS  As needed (PRN)     Route:  Intravenous        11/14/17 1927          Dom Valdes MD  Cardiology   [facility]

## 2017-11-15 NOTE — NURSING
Pt arrived from OSH, accompanied by EMS with heparin gtt running. Pt connected to the monitor, VS stable at this time, pt is aaox4, denies any chest pain. Cardiology service Dr Valdes at bedside, came and assessed patient, MD ordered for labs to be drawn and heparin gtt to be continued. Plan for bedside echo to be done soon.

## 2017-11-15 NOTE — PLAN OF CARE
Problem: Patient Care Overview  Goal: Plan of Care Review  Outcome: Ongoing (interventions implemented as appropriate)  No acute events throughout day. See vital signs and assessments in flowsheets. See below for updates on today's progress.     Pulmonary: room air with SpO2 maintaining 100%; SOB reported with exertion    Cardiovascular: SR 60s-70s with SBP 110s-140s    Neurological: AAOx4; afebrile    Gastrointestinal: cardiac diet ordered; liquid BM noted    Genitourinary: voids spontaneously per urinal - amy in color    Endocrine: SSI ordered for hyperglycemia    Integumentary/Other: + DVT in L popliteal    Infusions: Heparin gtt infusing    Patient progressing towards goals as tolerated, plan of care communicated and reviewed with Juan Johnson and family. All concerns addressed. Will continue to monitor.

## 2017-11-16 VITALS
HEIGHT: 73 IN | DIASTOLIC BLOOD PRESSURE: 74 MMHG | WEIGHT: 235.69 LBS | HEART RATE: 82 BPM | SYSTOLIC BLOOD PRESSURE: 137 MMHG | BODY MASS INDEX: 31.24 KG/M2 | TEMPERATURE: 98 F | RESPIRATION RATE: 18 BRPM | OXYGEN SATURATION: 98 %

## 2017-11-16 LAB
ALBUMIN SERPL BCP-MCNC: 2.6 G/DL
ALP SERPL-CCNC: 60 U/L
ALT SERPL W/O P-5'-P-CCNC: 14 U/L
ANION GAP SERPL CALC-SCNC: 11 MMOL/L
AST SERPL-CCNC: 20 U/L
BILIRUB SERPL-MCNC: 0.7 MG/DL
BUN SERPL-MCNC: 7 MG/DL
CALCIUM SERPL-MCNC: 9.2 MG/DL
CHLORIDE SERPL-SCNC: 109 MMOL/L
CO2 SERPL-SCNC: 23 MMOL/L
CREAT SERPL-MCNC: 0.7 MG/DL
EST. GFR  (AFRICAN AMERICAN): >60 ML/MIN/1.73 M^2
EST. GFR  (NON AFRICAN AMERICAN): >60 ML/MIN/1.73 M^2
FACT X PPP CHRO-ACNC: 0.71 IU/ML
FACT X PPP CHRO-ACNC: 0.76 IU/ML
GLUCOSE SERPL-MCNC: 149 MG/DL
MAGNESIUM SERPL-MCNC: 1.6 MG/DL
POCT GLUCOSE: 163 MG/DL (ref 70–110)
POTASSIUM SERPL-SCNC: 3.7 MMOL/L
POTASSIUM SERPL-SCNC: 3.7 MMOL/L
PROT SERPL-MCNC: 5.7 G/DL
SODIUM SERPL-SCNC: 143 MMOL/L

## 2017-11-16 PROCEDURE — 85520 HEPARIN ASSAY: CPT | Mod: 91

## 2017-11-16 PROCEDURE — 84132 ASSAY OF SERUM POTASSIUM: CPT

## 2017-11-16 PROCEDURE — 80053 COMPREHEN METABOLIC PANEL: CPT

## 2017-11-16 PROCEDURE — 85520 HEPARIN ASSAY: CPT

## 2017-11-16 PROCEDURE — 25000003 PHARM REV CODE 250: Performed by: INTERNAL MEDICINE

## 2017-11-16 PROCEDURE — 36415 COLL VENOUS BLD VENIPUNCTURE: CPT

## 2017-11-16 PROCEDURE — 63600175 PHARM REV CODE 636 W HCPCS: Performed by: STUDENT IN AN ORGANIZED HEALTH CARE EDUCATION/TRAINING PROGRAM

## 2017-11-16 PROCEDURE — 25000003 PHARM REV CODE 250: Performed by: STUDENT IN AN ORGANIZED HEALTH CARE EDUCATION/TRAINING PROGRAM

## 2017-11-16 PROCEDURE — 63600175 PHARM REV CODE 636 W HCPCS: Performed by: INTERNAL MEDICINE

## 2017-11-16 PROCEDURE — 83735 ASSAY OF MAGNESIUM: CPT

## 2017-11-16 PROCEDURE — 99239 HOSP IP/OBS DSCHRG MGMT >30: CPT | Mod: GC,,, | Performed by: INTERNAL MEDICINE

## 2017-11-16 RX ORDER — POTASSIUM CHLORIDE 20 MEQ/1
60 TABLET, EXTENDED RELEASE ORAL ONCE
Status: COMPLETED | OUTPATIENT
Start: 2017-11-16 | End: 2017-11-16

## 2017-11-16 RX ORDER — MAGNESIUM SULFATE HEPTAHYDRATE 40 MG/ML
2 INJECTION, SOLUTION INTRAVENOUS ONCE
Status: COMPLETED | OUTPATIENT
Start: 2017-11-16 | End: 2017-11-16

## 2017-11-16 RX ADMIN — HEPARIN SODIUM AND DEXTROSE 13 UNITS/KG/HR: 10000; 5 INJECTION INTRAVENOUS at 02:11

## 2017-11-16 RX ADMIN — POTASSIUM CHLORIDE 60 MEQ: 1500 TABLET, EXTENDED RELEASE ORAL at 08:11

## 2017-11-16 RX ADMIN — HEPARIN SODIUM AND DEXTROSE 12 UNITS/KG/HR: 10000; 5 INJECTION INTRAVENOUS at 05:11

## 2017-11-16 RX ADMIN — PANTOPRAZOLE SODIUM 40 MG: 40 TABLET, DELAYED RELEASE ORAL at 08:11

## 2017-11-16 RX ADMIN — MAGNESIUM SULFATE IN WATER 2 G: 40 INJECTION, SOLUTION INTRAVENOUS at 08:11

## 2017-11-16 NOTE — NURSING TRANSFER
Nursing Transfer Note      11/15/2017     Transfer To: 323    Transfer via wheelchair    Transfer with cardiac monitoring    Transported by RN & PCT    Medicines sent: Heparin gtt    Chart send with patient: Yes    Patient reassessed at: 11/15/17 1856    Upon arrival to floor: cardiac monitor applied, patient oriented to room, call bell in reach and bed in lowest position

## 2017-11-16 NOTE — DISCHARGE SUMMARY
[facility]  Cardiology  Discharge Summary      Patient Name: Juan Johnson  MRN: [id number]  Admission Date: 11/14/2017  Hospital Length of Stay: 2 days  Discharge Date and Time:  11/16/2017 1:23 PM  Attending Physician: Gera Delacruz MD    Discharging Provider: Kristine Mandujano MD  Primary Care Physician: Primary Doctor No    HPI:   67 year old male with h/o DM-2, HTN who presents as a transfer from [facility] with diagnosis of PE. Patient is currently resting in bed and denying any complaints.  HR is 70, BP is 117/78 satting 100% on 2 l NC.  He reports that he has been having dizziness and SOB on exertion x 4 months. Patient denies any chest pain, syncope, cough, hemoptysis.  Of note he has an almost 200 lb weight loss over past 6 months. 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    Echo: no RV strain, tapse 1.9    * No surgery found *     Indwelling Lines/Drains at time of discharge:  Lines/Drains/Airways          No matching active lines, drains, or airways          Hospital Course:  11/15/17- Continue on heparin for PE and LLE DVT.    11/16/17-patient will be discharged on Xarelto with follow up for PE and weight loss with PCP    Consults:   Consults         Status Ordering Provider     Inpatient consult to Interventional Cardiology  Once     Provider:  (Not yet assigned)    Completed AFSHIN BLANTON          Significant Diagnostic Studies: Labs:   CMP   Recent Labs  Lab 11/14/17  1945  11/15/17  0415 11/15/17  1418 11/16/17  0423     --  140  --  143   K 2.8*  < > 3.2* 3.3* 3.7  3.7   CL 98  --  103  --  109   CO2 27  --  27  --  23   *  --  149*  --  149*   BUN 10  --  9  --  7*   CREATININE 0.8  --  0.8  --  0.7   CALCIUM 9.3  --  8.9  --  9.2   PROT 6.3  --  5.4*  --  5.7*   ALBUMIN 2.9*  --  2.6*  --  2.6*   BILITOT 0.9  --  0.9  --  0.7   ALKPHOS 60  --  60   --  60   AST 22  --  24  --  20   ALT 15  --  12  --  14   ANIONGAP 14  --  10  --  11   ESTGFRAFRICA >60.0  --  >60.0  --  >60.0   EGFRNONAA >60.0  --  >60.0  --  >60.0   < > = values in this interval not displayed., CBC   Recent Labs  Lab 11/14/17  1945 11/15/17  0415   WBC 8.44 7.78   HGB 11.6* 10.7*   HCT 33.7* 31.2*    171    and Troponin   Recent Labs  Lab 11/15/17  0415   TROPONINI 0.056*       Pending Diagnostic Studies:     None          Final Active Diagnoses:    Diagnosis Date Noted POA    PRINCIPAL PROBLEM:  Acute saddle pulmonary embolism [I26.92] 11/14/2017 Yes    Type 2 diabetes mellitus without complication [E11.9] 11/14/2017 Yes     Chronic    Weight loss [R63.4] 11/14/2017 Yes      Problems Resolved During this Admission:    Diagnosis Date Noted Date Resolved POA     * Acute saddle pulmonary embolism    -Low risk submassive PE and venous doppler LE Indeterminate DVT of the Popliteal Vein.  -Continued on anticoagulation with heparin while inpatient  -At the time of admit, we discussed case with interventional cardiology - no need for intervention   - 2d echo showed normal left ventricular systolic function  -discharged on Xarelto 15 mg twice daily with food for 21 days followed by 20 mg once daily with food (this is an unproved DVT, will need >3 months)  -F/U with PCP for PE            Weight loss    -Unintentional 200 Lb loss per patient in last 4-6 months  -on discharge can follow up with PCP        Type 2 diabetes mellitus without complication    -Was on oral meds but were discontinued on admit and now diet controlled  -Insulin sliding scale was not needed while in hospital  -continue to follow diabetic diet on discharge            Discharged Condition: fair    Disposition: Home or Self Care    Follow Up:  Follow-up Information     [facility]. Schedule an appointment as soon as possible for a visit in 1 week.    Why:  The patient needs to call for new patient  appt to establish care with a PCP and to follow up for post hospitalization for PE and new medications  Contact information:  [address]  [facility] [address] [phone]                 Patient Instructions:     Diet general     Activity as tolerated     Call MD for:  temperature >100.4     Call MD for:  persistent nausea and vomiting or diarrhea     Call MD for:  severe uncontrolled pain     Call MD for:  redness, tenderness, or signs of infection (pain, swelling, redness, odor or green/yellow discharge around incision site)     Call MD for:  difficulty breathing or increased cough     Call MD for:  severe persistent headache     Call MD for:  worsening rash     Call MD for:  persistent dizziness, light-headedness, or visual disturbances     Call MD for:  increased confusion or weakness     No dressing needed       Medications:  Reconciled Home Medications:   Current Discharge Medication List      START taking these medications    Details   rivaroxaban (XARELTO) 15 mg (42)- 20 mg (9) tablet dose pack use as directed  Qty: 1 Package, Refills: 0             Time spent on the discharge of patient: 60 minutes    Kristine Mandujano MD  Cardiology  [facility]

## 2017-11-16 NOTE — PROGRESS NOTES
Mr. Johnson provided with Xarelto starter pack from pharmacy.  Informed from Cathy KUMAR that patient took first dose of starter pack.  Patient is covered from an anticoagulation standpoint from this first dose and can be discharged home to continue following Xarelto starter pack tomorrow (15mg BID).      Kristine Mandujano MD  Internal Medicine, PGY2  Pager [phone]

## 2017-11-16 NOTE — SUBJECTIVE & OBJECTIVE
Interval History: NAEON. Pt is not having CP, but continues to have SOB on exertion.     Review of Systems   Constitution: Positive for weakness. Negative for malaise/fatigue, night sweats and weight loss.   HENT: Negative for nosebleeds.    Eyes: Negative for vision loss in left eye and vision loss in right eye.   Cardiovascular: Positive for dyspnea on exertion. Negative for chest pain, claudication, cyanosis, irregular heartbeat, leg swelling and near-syncope.   Respiratory: Positive for shortness of breath (on exertion ). Negative for cough and sleep disturbances due to breathing.    Endocrine: Negative for cold intolerance and heat intolerance.   Skin: Negative for rash and skin cancer.   Musculoskeletal: Negative for back pain and falls.   Gastrointestinal: Negative for melena, nausea and vomiting.   Genitourinary: Negative for frequency and pelvic pain.     Objective:     Vital Signs (Most Recent):  Temp: 98 °F (36.7 °C) (11/16/17 0427)  Pulse: (!) 118 (11/16/17 0701)  Resp: 16 (11/16/17 0427)  BP: 129/82 (11/16/17 0427)  SpO2: 98 % (11/16/17 0427) Vital Signs (24h Range):  Temp:  [97.5 °F (36.4 °C)-98.7 °F (37.1 °C)] 98 °F (36.7 °C)  Pulse:  [] 118  Resp:  [13-25] 16  SpO2:  [93 %-100 %] 98 %  BP: (122-174)/(68-89) 129/82     Weight: 110 kg (242 lb 8.1 oz)  Body mass index is 31.99 kg/m².     SpO2: 98 %  O2 Device (Oxygen Therapy): room air      Intake/Output Summary (Last 24 hours) at 11/16/17 0758  Last data filed at 11/16/17 0522   Gross per 24 hour   Intake            604.8 ml   Output              300 ml   Net            304.8 ml       Lines/Drains/Airways     Peripheral Intravenous Line                 Peripheral IV - Single Lumen 11/14/17 Left Hand 2 days         Peripheral IV - Single Lumen 11/14/17 Right Antecubital 2 days                Physical Exam   Constitutional: He is oriented to person, place, and time. He appears well-developed and well-nourished.   HENT:   Head: Normocephalic and  atraumatic.   Eyes: EOM are normal. Pupils are equal, round, and reactive to light.   Neck: Normal range of motion. Neck supple. No JVD present.   Cardiovascular: Normal rate, regular rhythm, normal heart sounds and intact distal pulses.  Exam reveals no gallop and no friction rub.    No murmur heard.  Pulmonary/Chest: Effort normal and breath sounds normal. No respiratory distress. He has no wheezes. He has no rales. He exhibits no tenderness.   Abdominal: Soft. Bowel sounds are normal. He exhibits no distension. There is no tenderness.   Excess skin on abdomen   Musculoskeletal: Normal range of motion. He exhibits no edema or tenderness.   Neurological: He is alert and oriented to person, place, and time.   Skin: Skin is warm and dry.   Vitals reviewed.      Significant Labs:   Blood Culture: No results for input(s): LABBLOO in the last 48 hours., CMP   Recent Labs  Lab 11/14/17  1945  11/15/17  0415 11/15/17  1418 11/16/17  0423     --  140  --  143   K 2.8*  < > 3.2* 3.3* 3.7  3.7   CL 98  --  103  --  109   CO2 27  --  27  --  23   *  --  149*  --  149*   BUN 10  --  9  --  7*   CREATININE 0.8  --  0.8  --  0.7   CALCIUM 9.3  --  8.9  --  9.2   PROT 6.3  --  5.4*  --  5.7*   ALBUMIN 2.9*  --  2.6*  --  2.6*   BILITOT 0.9  --  0.9  --  0.7   ALKPHOS 60  --  60  --  60   AST 22  --  24  --  20   ALT 15  --  12  --  14   ANIONGAP 14  --  10  --  11   ESTGFRAFRICA >60.0  --  >60.0  --  >60.0   EGFRNONAA >60.0  --  >60.0  --  >60.0   < > = values in this interval not displayed., CBC   Recent Labs  Lab 11/14/17  1945 11/15/17  0415   WBC 8.44 7.78   HGB 11.6* 10.7*   HCT 33.7* 31.2*    171    and Troponin   Recent Labs  Lab 11/14/17  1945 11/15/17  0045 11/15/17  0415   TROPONINI 0.062* 0.061* 0.056*       Significant Imaging: all imaging reviewed

## 2017-11-16 NOTE — NURSING
Patient arrived to floor.Patient connected to telemetry. Vs within defined limits. Will continue to monitor.

## 2017-11-16 NOTE — PROGRESS NOTES
Vianca Mandujano notified patient's AntiXa resulted 0.71. MD orders to STOP Heparin gtt at this time and MD will place orders for discharge shortly. Orders to be carried out. Will continue to monitor.

## 2017-11-16 NOTE — PLAN OF CARE
Problem: Patient Care Overview  Goal: Plan of Care Review  Outcome: Ongoing (interventions implemented as appropriate)  Discussed Plan of Care with patient. Ambulates on own for short distances. Fall precautions in place. Bleeding precautions reviewed. Pain denied. AVS reviewed with patient. Follow-up appointments discussed, as well as important phone numbers. Home medication dose, frequency, indication, and side effects discussed. In-house pharmacy has brought Xarelto to bedside for patient home use. MD orders to take todays dose now and hold this evenings dose; patient in agreement. PIVs and telemetry removed without complication. Blood glucose monitored. All questions and concerns were addressed. Will continue to monitor patient.

## 2017-11-16 NOTE — PROGRESS NOTES
[facility]  Cardiology  Progress Note    Patient Name: Juan Johnson  MRN: [id number]  Admission Date: 11/14/2017  Hospital Length of Stay: 2 days  Code Status: Full Code   Attending Physician: Gera Delacruz MD   Primary Care Physician: Primary Doctor No  Expected Discharge Date: 11/17/2017  Principal Problem:Acute saddle pulmonary embolism    Subjective:     Hospital Course:   11/15/17- Continue on heparin for PE and LLE DVT.      Interval History: NAEON. Pt is not having CP, but continues to have SOB on exertion.     Review of Systems   Constitution: Positive for weakness. Negative for malaise/fatigue, night sweats and weight loss.   HENT: Negative for nosebleeds.    Eyes: Negative for vision loss in left eye and vision loss in right eye.   Cardiovascular: Positive for dyspnea on exertion. Negative for chest pain, claudication, cyanosis, irregular heartbeat, leg swelling and near-syncope.   Respiratory: Positive for shortness of breath (on exertion ). Negative for cough and sleep disturbances due to breathing.    Endocrine: Negative for cold intolerance and heat intolerance.   Skin: Negative for rash and skin cancer.   Musculoskeletal: Negative for back pain and falls.   Gastrointestinal: Negative for melena, nausea and vomiting.   Genitourinary: Negative for frequency and pelvic pain.     Objective:     Vital Signs (Most Recent):  Temp: 98 °F (36.7 °C) (11/16/17 0427)  Pulse: (!) 118 (11/16/17 0701)  Resp: 16 (11/16/17 0427)  BP: 129/82 (11/16/17 0427)  SpO2: 98 % (11/16/17 0427) Vital Signs (24h Range):  Temp:  [97.5 °F (36.4 °C)-98.7 °F (37.1 °C)] 98 °F (36.7 °C)  Pulse:  [] 118  Resp:  [13-25] 16  SpO2:  [93 %-100 %] 98 %  BP: (122-174)/(68-89) 129/82     Weight: 110 kg (242 lb 8.1 oz)  Body mass index is 31.99 kg/m².     SpO2: 98 %  O2 Device (Oxygen Therapy): room air      Intake/Output Summary (Last 24 hours) at 11/16/17 6737  Last data filed at 11/16/17 0522   Gross per 24 hour    Intake            604.8 ml   Output              300 ml   Net            304.8 ml       Lines/Drains/Airways     Peripheral Intravenous Line                 Peripheral IV - Single Lumen 11/14/17 Left Hand 2 days         Peripheral IV - Single Lumen 11/14/17 Right Antecubital 2 days                Physical Exam   Constitutional: He is oriented to person, place, and time. He appears well-developed and well-nourished.   HENT:   Head: Normocephalic and atraumatic.   Eyes: EOM are normal. Pupils are equal, round, and reactive to light.   Neck: Normal range of motion. Neck supple. No JVD present.   Cardiovascular: Normal rate, regular rhythm, normal heart sounds and intact distal pulses.  Exam reveals no gallop and no friction rub.    No murmur heard.  Pulmonary/Chest: Effort normal and breath sounds normal. No respiratory distress. He has no wheezes. He has no rales. He exhibits no tenderness.   Abdominal: Soft. Bowel sounds are normal. He exhibits no distension. There is no tenderness.   Excess skin on abdomen   Musculoskeletal: Normal range of motion. He exhibits no edema or tenderness.   Neurological: He is alert and oriented to person, place, and time.   Skin: Skin is warm and dry.   Vitals reviewed.      Significant Labs:   Blood Culture: No results for input(s): LABBLOO in the last 48 hours., CMP   Recent Labs  Lab 11/14/17  1945  11/15/17  0415 11/15/17  1418 11/16/17  0423     --  140  --  143   K 2.8*  < > 3.2* 3.3* 3.7  3.7   CL 98  --  103  --  109   CO2 27  --  27  --  23   *  --  149*  --  149*   BUN 10  --  9  --  7*   CREATININE 0.8  --  0.8  --  0.7   CALCIUM 9.3  --  8.9  --  9.2   PROT 6.3  --  5.4*  --  5.7*   ALBUMIN 2.9*  --  2.6*  --  2.6*   BILITOT 0.9  --  0.9  --  0.7   ALKPHOS 60  --  60  --  60   AST 22  --  24  --  20   ALT 15  --  12  --  14   ANIONGAP 14  --  10  --  11   ESTGFRAFRICA >60.0  --  >60.0  --  >60.0   EGFRNONAA >60.0  --  >60.0  --  >60.0   < > = values in this  interval not displayed., CBC   Recent Labs  Lab 11/14/17  1945 11/15/17  0415   WBC 8.44 7.78   HGB 11.6* 10.7*   HCT 33.7* 31.2*    171    and Troponin   Recent Labs  Lab 11/14/17  1945 11/15/17  0045 11/15/17  0415   TROPONINI 0.062* 0.061* 0.056*       Significant Imaging: all imaging reviewed    Assessment and Plan:         * Acute saddle pulmonary embolism    -Low risk submassive PE and venous doppler LE Indeterminate DVT of the Popliteal Vein.  -Continue anticoagulation with heparin  -Discussed case with interventional cardiology - no need for intervention at this time  - 2d echo showed normal left ventricular systolic function  -will be discharged on 15 mg twice daily with food for 21 days followed by 20 mg once daily with food (this is an unproved DVT, will need >3 months)  -F/U with PCP            Weight loss    -Unintentional 200 Lb loss per patient in last 4-6 months  -on discharge can follow up with PCP        Type 2 diabetes mellitus without complication    -Was on oral meds but were discontinued and now diet controlled  -Insulin sliding scale            VTE Risk Mitigation         Ordered     High Risk of VTE  Once      11/15/17 0844     Reason for no Mechanical VTE Prophylaxis  Once      11/15/17 0844     heparin 25,000 units in dextrose 5% 250 mL (100 units/mL) infusion  Continuous     Route:  Intravenous        11/14/17 1927     heparin 25,000 units in dextrose 5% 250 mL (100 units/mL) bolus from bag; ADDITIONAL PRN BOLUS  As needed (PRN)     Route:  Intravenous        11/14/17 1927     heparin 25,000 units in dextrose 5% 250 mL (100 units/mL) bolus from bag; ADDITIONAL PRN BOLUS  As needed (PRN)     Route:  Intravenous        11/14/17 1927          Kristine Mandujano MD  Cardiology  [facility]

## 2017-11-16 NOTE — ASSESSMENT & PLAN NOTE
-Was on oral meds but were discontinued on admit and now diet controlled  -Insulin sliding scale was not needed while in hospital  -continue to follow diabetic diet on discharge

## 2017-11-16 NOTE — PLAN OF CARE
Problem: Patient Care Overview  Goal: Plan of Care Review  Outcome: Ongoing (interventions implemented as appropriate)  Pt had no significant event overnight. Pt remained free of falls throughout the shift. Pt encouraged to turn often to prevent pressure ulcers from forming. Pt is receiving heparin to prevent further DVT from forming VS remained within normal limits. POC reviewed with pt, pt acknowledged understanding.

## 2017-11-16 NOTE — ASSESSMENT & PLAN NOTE
-Low risk submassive PE and venous doppler LE Indeterminate DVT of the Popliteal Vein.  -Continued on anticoagulation with heparin while inpatient  -At the time of admit, we discussed case with interventional cardiology - no need for intervention   - 2d echo showed normal left ventricular systolic function  -discharged on Xarelto 15 mg twice daily with food for 21 days followed by 20 mg once daily with food (this is an unproved DVT, will need >3 months)  -F/U with PCP for PE

## 2017-11-17 ENCOUNTER — PATIENT OUTREACH (OUTPATIENT)
Dept: ADMINISTRATIVE | Facility: CLINIC | Age: 67
End: 2017-11-17

## 2017-11-17 NOTE — PATIENT INSTRUCTIONS
Discharge Instructions for Pulmonary Embolism  A deep vein thrombosis (DVT) is a blood clot in a large vein deep in a leg, arm, or elsewhere in the body. The clot can separate from the vein, travel to the lungs and cut off blood flow. This is a pulmonary embolism (PE). Pulmonary embolism is very serious and may cause death.   Healthcare providers use the term venous thromboembolism (VTE) to describe both DVT and PE. They use the term VTE because the two conditions are very closely related. And, because their prevention and treatment are closely related.   Home care  Taking care of yourself is very important. To help prevent more blood clots from forming, follow your healthcare provider's instructions. Do the following:  · Take your medicines exactly as instructed. Dont skip doses. If you miss a dose, call your healthcare provider and ask what you should do.    · Have all lab tests as recommended. This is very important when you take medicines to prevent blood clots.   · If your healthcare provider has instructed you to do so, wear elastic (compression stockings).  · Get up and get moving.  · While sitting for long periods of time, move your knees, ankles, feet, and toes.  Lifestyle changes  To help prevent problems with your heart and blood vessels, do the following:   · If you smoke, get help to quit. Talk with your healthcare provider about medicines and programs that can help.  · Stay at a healthy weight. Talk to your healthcare provider about losing weight, if you are overweight  · Try to exercise at least 30 minutes on most days. Before starting an exercise program, talk with your healthcare provider.   · When traveling by car, make frequent stops to get up and move around.  · On long airplane rides, get up and move around when possible. If you cant get up, wiggle your toes, move your ankles and tighten your calves to keep your blood moving.  Follow-up care  Make a follow-up appointment as directed  Have  your lab work done as directed.     When to seek medical advice  Call your healthcare provider if you have pain, swelling, and redness in your leg, arm, or other body area. These symptoms may mean another blood clot.  And, call your healthcare provider if you have signs and symptoms of bleeding, like blood in your urine, bleeding with bowel movements, or bleeding from the nose, gums, a cut, or vagina.   Call 911  Call 911 or get emergency help if you have symptoms of a blood clot in the lungs including:   · Chest pain  · Trouble breathing  · Coughing (may cough up blood)  · Fast heartbeat  · Sweating  · Fainting  Also call 911 if you have:  · Heavy or uncontrolled bleeding. If you are taking a blood thinner, you have an increased chance of bleeding.   Date Last Reviewed: 2/1/2017  © 6265-3853 [employer]. [address] [place] [id number]. All rights reserved. This information is not intended as a substitute for professional medical care. Always follow your healthcare professional's instructions.

## 2018-04-03 ENCOUNTER — TELEPHONE (OUTPATIENT)
Dept: HEMATOLOGY/ONCOLOGY | Facility: CLINIC | Age: 68
End: 2018-04-03